# Patient Record
Sex: FEMALE | Race: OTHER
[De-identification: names, ages, dates, MRNs, and addresses within clinical notes are randomized per-mention and may not be internally consistent; named-entity substitution may affect disease eponyms.]

---

## 2017-10-30 ENCOUNTER — HOSPITAL ENCOUNTER (EMERGENCY)
Dept: HOSPITAL 80 - FED | Age: 22
LOS: 1 days | Discharge: HOME | End: 2017-10-31
Payer: COMMERCIAL

## 2017-10-30 DIAGNOSIS — M54.5: Primary | ICD-10-CM

## 2017-10-30 DIAGNOSIS — G89.29: ICD-10-CM

## 2017-10-30 NOTE — EDPHY
H & P


Stated Complaint: LOW BACK PAIN X 4 YEARS


Time Seen by Provider: 10/30/17 21:28


HPI/ROS: 





CHIEF COMPLAINT:  Low back pain x4 years, worse tonight





HISTORY OF PRESENT ILLNESS:  22-year-old female history of low back pain for 

the past 4 years after giving birth to her child, works as a dental hygienist, 

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 

pain.  She describes it as primarily in her bilateral buttocks with no 

radicular symptoms into her lower extremities.  Pain is reproducible with range 

of motion.  No trauma.  No fall.  No weakness.  No incontinence.  No retention.

  No saddle anesthesia.  No urinary abnormality.





She has been seen at the [facility] for similar, never had MRI of back 





PRIMARY CARE PROVIDER:the [facility]  





REVIEW OF SYSTEMS:


A ten point review of systems was performed and is negative with the exception 

of the items mentioned in the HPI








PAST MEDICAL & SURGICAL  HISTORY:  low back pain x4 years    





SOCIAL HISTORY: nonsmoker.  No IV drug use.    














************


PHYSICAL EXAM





(Prior to examination, patient consented to physical exam, hands were washed 

and my usual and customary physical exam procedures followed)


1) GENERAL: Well-developed, well-nourished, alert and oriented.  Appears 

uncomfortable 


2) HEAD: Normocephalic, atraumatic


3) HEENT: Pupils equal, round, reactive to light bilaterally.  Sclera 

anicteric.  Nasopharynx, oropharynx, clear, no lesions.  


4) NECK: Full range of motion, no meningeal signs.


5) LUNGS: Clear auscultation bilaterally, no wheezes, no rhonchi, no 

retractions.   


6) HEART: Regular rate and rhythm, no murmur, no heave, no gallop.


7) ABDOMEN: No guarding, no rebound, no focal tenderness, negative McBurney's, 

negative Jain's, negative Rovsing's, negative peritoneal sign,


8) MUSCULOSKELETAL: Moving all extremities, no focal areas of tenderness, no 

obvious trauma.  No peripheral edema or discoloration.


9) BACK:  Positive straight leg lift test right leg approximately 10 degrees.. 

No CVA tenderness, no midline vertebral tenderness, no fluctuance, no step-off, 

no obvious trauma, no visual or palpable abnormality.  Patella, Achilles 

reflexes intact to bilateral strength 5/5


10) SKIN: No rash, no petechiae. 








***************





DIFFERENTIAL DIAGNOSIS:  In no particular order, including but not limited to,  

fracture, sprain/strain, cauda equina, spinal infectious etiology. 





**************


MEDICAL DECISION MAKING


This patient has been re-evaluated with serial examinations.  She did feel 

transient dizziness and anxiety after being given ketamine.  She was observed 

for period of time given IV fluids and subsequently felt improvement.  I 

empathized with her chronic pain.  Informed her that I have a Lower index of 

suspicion for cauda equina, epidural abscess, epidural hematoma, lumbar myositis

, diskitis, as the patient is neurologically intact in the lower extremities, 

has patella and Achilles reflexes intact and equal bilaterally, has no 

neurologic deficits, no incontinence, no retention, no midline pain, no 

fluctuance, afebrile, no flulike symptoms.  Pain may be secondary to muscular 

strain, may be secondary to discogenic etiology.  At this point I do not 

identify definitive indication for emergent MRI, however patient may 

necessitate this on an outpatient basis. 





- Personal History


LMP (Females 10-55): 8-14 Days Ago


Current Tetanus/Diphtheria Vaccine: Yes


Current Tetanus Diphtheria and Acellular Pertussis (TDAP): Yes





- Medical/Surgical History


Hx Asthma: No


Hx Chronic Respiratory Disease: No


Hx Diabetes: No


Hx Cardiac Disease: No


Hx Renal Disease: No


Hx Cirrhosis: No


Hx Alcoholism: No


Hx HIV/AIDS: No


Hx Splenectomy or Spleen Trauma: No


Other PMH: OWER BACK PAIN X 4 YEARS





- Social History


Smoking Status: Never smoked


Constitutional: 


 Initial Vital Signs











Temperature (C)  36.7 C   10/30/17 21:00


 


Heart Rate  94   10/30/17 21:00


 


Respiratory Rate  18   10/30/17 21:00


 


Blood Pressure  121/83 H  10/30/17 21:00


 


O2 Sat (%)  96   10/30/17 21:00








 











O2 Delivery Mode               Room Air














Allergies/Adverse Reactions: 


 





No Known Allergies Allergy (Unverified 10/30/17 21:02)


 








Home Medications: 














 Medication  Instructions  Recorded


 


Hydrocodone/APAP 5/325 [Norco 1 tab PO Q6 PRN #10 tab 10/30/17





5/325 (RX)]  


 


methylPREDNISolone [Medrol Dose 4 mg PO DAILY #1 ea 10/30/17





Varghese]  














Medical Decision Making





- Data Points


Laboratory Results: 


 











  10/30/17





  21:54


 


Beta HCG, Qual  NEGATIVE 





  











Medications Given: 


 








Discontinued Medications





Dexamethasone (Decadron Injection)  8 mg IVP EDNOW ONE


   Stop: 10/30/17 21:54


   Last Admin: 10/30/17 22:08 Dose:  8 mg


Ketamine HCl (Ketamine)  12.7 mg 0.2 mg/kg (12.7 mg) IVP EDNOW ONE


   Stop: 10/30/17 21:54


   Last Admin: 10/30/17 22:08 Dose:  12.7 mg








Departure





- Departure


Disposition: Home, Routine, Self-Care


Clinical Impression: 


Low back pain


Qualifiers:


 Chronicity: chronic Back pain laterality: bilateral Sciatica presence: without 

sciatica Qualified Code(s): M54.5 - Low back pain; G89.29 - Other chronic pain; 

G89.29 - Other chronic pain





Condition: Good


Instructions:  Acute Low Back Pain (ED)


Additional Instructions: 


Seek medical attention if you develop new or worsening pain, if you develop 

bladder or bowel dysfunction, numbness around your perineum, foot drop, or any 

other symptoms that concern you. 


Referrals: 


Roma Mandujano PA [Primary Care Provider] - 1-2 days without fail


Prescriptions: 


Hydrocodone/APAP 5/325 [Norco 5/325 (RX)] 1 tab PO Q6 PRN #10 tab


 PRN Reason: Pain, Severe


methylPREDNISolone [Medrol Dose Varghese] 4 mg PO DAILY #1 ea

## 2017-10-30 NOTE — EDPHY
H & P


Stated Complaint: LOW BACK PAIN X 4 YEARS


Time Seen by Provider: 10/30/17 21:28


HPI/ROS: 





CHIEF COMPLAINT:  Low back pain x4 years, worse tonight





HISTORY OF PRESENT ILLNESS:  22-year-old female history of low back pain for 

the past 4 years after giving birth to her child, works as a dental hygienist, 

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 

pain.  She describes it as primarily in her bilateral buttocks with no 

radicular symptoms into her lower extremities.  Pain is reproducible with range 

of motion.  No trauma.  No fall.  No weakness.  No incontinence.  No retention.

  No saddle anesthesia.  No urinary abnormality.





She has been seen at the [facility] for similar, never had MRI of back 





PRIMARY CARE PROVIDER:the [facility]  





REVIEW OF SYSTEMS:


A ten point review of systems was performed and is negative with the exception 

of the items mentioned in the HPI








PAST MEDICAL & SURGICAL  HISTORY:  low back pain x4 years    





SOCIAL HISTORY: nonsmoker.  No IV drug use.    














************


PHYSICAL EXAM





(Prior to examination, patient consented to physical exam, hands were washed 

and my usual and customary physical exam procedures followed)


1) GENERAL: Well-developed, well-nourished, alert and oriented.  Appears 

uncomfortable 


2) HEAD: Normocephalic, atraumatic


3) HEENT: Pupils equal, round, reactive to light bilaterally.  Sclera 

anicteric.  Nasopharynx, oropharynx, clear, no lesions.  


4) NECK: Full range of motion, no meningeal signs.


5) LUNGS: Clear auscultation bilaterally, no wheezes, no rhonchi, no 

retractions.   


6) HEART: Regular rate and rhythm, no murmur, no heave, no gallop.


7) ABDOMEN: No guarding, no rebound, no focal tenderness, negative McBurney's, 

negative Jain's, negative Rovsing's, negative peritoneal sign,


8) MUSCULOSKELETAL: Moving all extremities, no focal areas of tenderness, no 

obvious trauma.  No peripheral edema or discoloration.


9) BACK:  Positive straight leg lift test right leg approximately 10 degrees.. 

No CVA tenderness, no midline vertebral tenderness, no fluctuance, no step-off, 

no obvious trauma, no visual or palpable abnormality.  Patella, Achilles 

reflexes intact to bilateral strength 5/5


10) SKIN: No rash, no petechiae. 








***************





DIFFERENTIAL DIAGNOSIS:  In no particular order, including but not limited to,  

fracture, sprain/strain, cauda equina, spinal infectious etiology. 





**************


MEDICAL DECISION MAKING


This patient has been re-evaluated with serial examinations.  She did feel 

transient dizziness and anxiety after being given ketamine.  She was observed 

for period of time given IV fluids and subsequently felt improvement.  I 

empathized with her chronic pain.  Informed her that I have a Lower index of 

suspicion for cauda equina, epidural abscess, epidural hematoma, lumbar myositis

, diskitis, as the patient is neurologically intact in the lower extremities, 

has patella and Achilles reflexes intact and equal bilaterally, has no 

neurologic deficits, no incontinence, no retention, no midline pain, no 

fluctuance, afebrile, no flulike symptoms.  Pain may be secondary to muscular 

strain, may be secondary to discogenic etiology.  At this point I do not 

identify definitive indication for emergent MRI, however patient may 

necessitate this on an outpatient basis. 





- Personal History


LMP (Females 10-55): 8-14 Days Ago


Current Tetanus/Diphtheria Vaccine: Yes


Current Tetanus Diphtheria and Acellular Pertussis (TDAP): Yes





- Medical/Surgical History


Hx Asthma: No


Hx Chronic Respiratory Disease: No


Hx Diabetes: No


Hx Cardiac Disease: No


Hx Renal Disease: No


Hx Cirrhosis: No


Hx Alcoholism: No


Hx HIV/AIDS: No


Hx Splenectomy or Spleen Trauma: No


Other PMH: OWER BACK PAIN X 4 YEARS





- Social History


Smoking Status: Never smoked


Constitutional: 


 Initial Vital Signs











Temperature (C)  36.7 C   10/30/17 21:00


 


Heart Rate  94   10/30/17 21:00


 


Respiratory Rate  18   10/30/17 21:00


 


Blood Pressure  121/83 H  10/30/17 21:00


 


O2 Sat (%)  96   10/30/17 21:00








 











O2 Delivery Mode               Room Air














Allergies/Adverse Reactions: 


 





No Known Allergies Allergy (Unverified 10/30/17 21:02)


 








Home Medications: 














 Medication  Instructions  Recorded


 


Hydrocodone/APAP 5/325 [Norco 1 tab PO Q6 PRN #10 tab 10/30/17





5/325 (RX)]  


 


methylPREDNISolone [Medrol Dose 4 mg PO DAILY #1 ea 10/30/17





Varghese]  














Medical Decision Making





- Data Points


Laboratory Results: 


 











  10/30/17





  21:54


 


Beta HCG, Qual  NEGATIVE 





  











Medications Given: 


 








Discontinued Medications





Dexamethasone (Decadron Injection)  8 mg IVP EDNOW ONE


   Stop: 10/30/17 21:54


   Last Admin: 10/30/17 22:08 Dose:  8 mg


Ketamine HCl (Ketamine)  12.7 mg 0.2 mg/kg (12.7 mg) IVP EDNOW ONE


   Stop: 10/30/17 21:54


   Last Admin: 10/30/17 22:08 Dose:  12.7 mg








Departure





- Departure


Disposition: Home, Routine, Self-Care


Clinical Impression: 


Low back pain


Qualifiers:


 Chronicity: chronic Back pain laterality: bilateral Sciatica presence: without 

sciatica Qualified Code(s): M54.5 - Low back pain; G89.29 - Other chronic pain; 

G89.29 - Other chronic pain





Condition: Good


Instructions:  Acute Low Back Pain (ED)


Additional Instructions: 


Seek medical attention if you develop new or worsening pain, if you develop 

bladder or bowel dysfunction, numbness around your perineum, foot drop, or any 

other symptoms that concern you. 


Referrals: 


Roma Mandujano PA [Primary Care Provider] - 1-2 days without fail


Prescriptions: 


Hydrocodone/APAP 5/325 [Norco 5/325 (RX)] 1 tab PO Q6 PRN #10 tab


 PRN Reason: Pain, Severe


methylPREDNISolone [Medrol Dose Varghese] 4 mg PO DAILY #1 ea

## 2017-10-30 NOTE — EDPHY
H & P


Stated Complaint: LOW BACK PAIN X 4 YEARS


Time Seen by Provider: 10/30/17 21:28


HPI/ROS: 





CHIEF COMPLAINT:  Low back pain x4 years, worse tonight





HISTORY OF PRESENT ILLNESS:  22-year-old female history of low back pain for 

the past 4 years after giving birth to her child, works as a dental hygienist, 

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 

pain.  She describes it as primarily in her bilateral buttocks with no 

radicular symptoms into her lower extremities.  Pain is reproducible with range 

of motion.  No trauma.  No fall.  No weakness.  No incontinence.  No retention.

  No saddle anesthesia.  No urinary abnormality.





She has been seen at the [facility] for similar, never had MRI of back 





PRIMARY CARE PROVIDER:the [facility]  





REVIEW OF SYSTEMS:


A ten point review of systems was performed and is negative with the exception 

of the items mentioned in the HPI








PAST MEDICAL & SURGICAL  HISTORY:  low back pain x4 years    





SOCIAL HISTORY: nonsmoker.  No IV drug use.    














************


PHYSICAL EXAM





(Prior to examination, patient consented to physical exam, hands were washed 

and my usual and customary physical exam procedures followed)


1) GENERAL: Well-developed, well-nourished, alert and oriented.  Appears 

uncomfortable 


2) HEAD: Normocephalic, atraumatic


3) HEENT: Pupils equal, round, reactive to light bilaterally.  Sclera 

anicteric.  Nasopharynx, oropharynx, clear, no lesions.  


4) NECK: Full range of motion, no meningeal signs.


5) LUNGS: Clear auscultation bilaterally, no wheezes, no rhonchi, no 

retractions.   


6) HEART: Regular rate and rhythm, no murmur, no heave, no gallop.


7) ABDOMEN: No guarding, no rebound, no focal tenderness, negative McBurney's, 

negative Jain's, negative Rovsing's, negative peritoneal sign,


8) MUSCULOSKELETAL: Moving all extremities, no focal areas of tenderness, no 

obvious trauma.  No peripheral edema or discoloration.


9) BACK:  Positive straight leg lift test right leg approximately 10 degrees.. 

No CVA tenderness, no midline vertebral tenderness, no fluctuance, no step-off, 

no obvious trauma, no visual or palpable abnormality.  Patella, Achilles 

reflexes intact to bilateral strength 5/5


10) SKIN: No rash, no petechiae. 








***************





DIFFERENTIAL DIAGNOSIS:  In no particular order, including but not limited to,  

fracture, sprain/strain, cauda equina, spinal infectious etiology. 





**************


MEDICAL DECISION MAKING


This patient has been re-evaluated with serial examinations.  She did feel 

transient dizziness and anxiety after being given ketamine.  She was observed 

for period of time given IV fluids and subsequently felt improvement.  I 

empathized with her chronic pain.  Informed her that I have a Lower index of 

suspicion for cauda equina, epidural abscess, epidural hematoma, lumbar myositis

, diskitis, as the patient is neurologically intact in the lower extremities, 

has patella and Achilles reflexes intact and equal bilaterally, has no 

neurologic deficits, no incontinence, no retention, no midline pain, no 

fluctuance, afebrile, no flulike symptoms.  Pain may be secondary to muscular 

strain, may be secondary to discogenic etiology.  At this point I do not 

identify definitive indication for emergent MRI, however patient may 

necessitate this on an outpatient basis. 





- Personal History


LMP (Females 10-55): 8-14 Days Ago


Current Tetanus/Diphtheria Vaccine: Yes


Current Tetanus Diphtheria and Acellular Pertussis (TDAP): Yes





- Medical/Surgical History


Hx Asthma: No


Hx Chronic Respiratory Disease: No


Hx Diabetes: No


Hx Cardiac Disease: No


Hx Renal Disease: No


Hx Cirrhosis: No


Hx Alcoholism: No


Hx HIV/AIDS: No


Hx Splenectomy or Spleen Trauma: No


Other PMH: OWER BACK PAIN X 4 YEARS





- Social History


Smoking Status: Never smoked


Constitutional: 


 Initial Vital Signs











Temperature (C)  36.7 C   10/30/17 21:00


 


Heart Rate  94   10/30/17 21:00


 


Respiratory Rate  18   10/30/17 21:00


 


Blood Pressure  121/83 H  10/30/17 21:00


 


O2 Sat (%)  96   10/30/17 21:00








 











O2 Delivery Mode               Room Air














Allergies/Adverse Reactions: 


 





No Known Allergies Allergy (Unverified 10/30/17 21:02)


 








Home Medications: 














 Medication  Instructions  Recorded


 


Hydrocodone/APAP 5/325 [Norco 1 tab PO Q6 PRN #10 tab 10/30/17





5/325 (RX)]  


 


methylPREDNISolone [Medrol Dose 4 mg PO DAILY #1 ea 10/30/17





Varghese]  














Medical Decision Making





- Data Points


Laboratory Results: 


 











  10/30/17





  21:54


 


Beta HCG, Qual  NEGATIVE 





  











Medications Given: 


 








Discontinued Medications





Dexamethasone (Decadron Injection)  8 mg IVP EDNOW ONE


   Stop: 10/30/17 21:54


   Last Admin: 10/30/17 22:08 Dose:  8 mg


Ketamine HCl (Ketamine)  12.7 mg 0.2 mg/kg (12.7 mg) IVP EDNOW ONE


   Stop: 10/30/17 21:54


   Last Admin: 10/30/17 22:08 Dose:  12.7 mg








Departure





- Departure


Disposition: Home, Routine, Self-Care


Clinical Impression: 


Low back pain


Qualifiers:


 Chronicity: chronic Back pain laterality: bilateral Sciatica presence: without 

sciatica Qualified Code(s): M54.5 - Low back pain; G89.29 - Other chronic pain; 

G89.29 - Other chronic pain





Condition: Good


Instructions:  Acute Low Back Pain (ED)


Additional Instructions: 


Seek medical attention if you develop new or worsening pain, if you develop 

bladder or bowel dysfunction, numbness around your perineum, foot drop, or any 

other symptoms that concern you. 


Referrals: 


Roma Mandujano PA [Primary Care Provider] - 1-2 days without fail


Prescriptions: 


Hydrocodone/APAP 5/325 [Norco 5/325 (RX)] 1 tab PO Q6 PRN #10 tab


 PRN Reason: Pain, Severe


methylPREDNISolone [Medrol Dose Varghese] 4 mg PO DAILY #1 ea

## 2017-10-31 VITALS
RESPIRATION RATE: 16 BRPM | DIASTOLIC BLOOD PRESSURE: 68 MMHG | HEART RATE: 64 BPM | SYSTOLIC BLOOD PRESSURE: 103 MMHG | OXYGEN SATURATION: 98 % | TEMPERATURE: 98.8 F

## 2018-04-22 ENCOUNTER — HOSPITAL ENCOUNTER (EMERGENCY)
Dept: HOSPITAL 80 - FED | Age: 23
Discharge: HOME | End: 2018-04-22
Payer: COMMERCIAL

## 2018-04-22 VITALS — DIASTOLIC BLOOD PRESSURE: 69 MMHG | SYSTOLIC BLOOD PRESSURE: 115 MMHG

## 2018-04-22 DIAGNOSIS — S00.01XA: ICD-10-CM
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
## 2018-04-22 DIAGNOSIS — O9A.212: Primary | ICD-10-CM

## 2018-04-22 DIAGNOSIS — Z3A.18: ICD-10-CM

## 2018-04-22 NOTE — EDPHY
H & P


Stated Complaint: Lac head from bumping into cabinet door


Time Seen by Provider: 18 19:00


HPI/ROS: 





Chief Complaint:  Minor head injury





HPI:  The patient presents the ED with a minor head injury.  She struck her 

head on the edge of a cabinet.  She did have some bleeding.  There is no loss 

of consciousness.  She was concerned about the possibility of a laceration 

prompting her visit to the ED today.  She denies additional injury.  She is 

approximately 18 weeks pregnant.  Tetanus shot is up-to-date.





REVIEW OF SYSTEMS:


Neuro:  no headache, numbness, weakness


Musculoskeletal:  as above


Skin:  As above


Source: Patient


Exam Limitations: No limitations





- Personal History


LMP (Females 10-55): Pregnant


EDC: 19


Current Tetanus Diphtheria and Acellular Pertussis (TDAP): Yes





- Medical/Surgical History


Hx Asthma: No


Hx Chronic Respiratory Disease: No


Hx Diabetes: No


Hx Cardiac Disease: No


Hx Renal Disease: No


Hx Cirrhosis: No


Hx Alcoholism: No


Hx HIV/AIDS: No


Hx Splenectomy or Spleen Trauma: No


Other PMH: LOWER BACK PAIN,CHRONIC.  





- Social History


Smoking Status: Never smoked





- Physical Exam


Exam: 








General Appearance:  Alert, no distress


Head:  Superficial nonsuturable abrasion noted to the left temporal scalp.  No 

hematoma, no bony tenderness


Neck:  Nontender, trachea midline











Constitutional: 


 Initial Vital Signs











Temperature (C)  37.2 C   18 18:52


 


Heart Rate  80   18 18:52


 


Respiratory Rate  16   18 18:52


 


Blood Pressure  115/69   18 18:52


 


O2 Sat (%)  100   18 18:52








 











O2 Delivery Mode               Room Air














Allergies/Adverse Reactions: 


 





No Known Allergies Allergy (Verified 18 18:52)


 








Home Medications: 














 Medication  Instructions  Recorded


 


NK [No Known Home Meds]  18














Medical Decision Making


ED Course/Re-evaluation: 





The patient has a nonsuturable abrasion.  She has no additional injury.  She 

has no pregnancy-related complaints.  She will be discharged from the emergency 

department with customary aftercare instructions and return precautions.





Departure





- Departure


Disposition: Home, Routine, Self-Care


Clinical Impression: 


Scalp abrasion


Qualifiers:


 Encounter type: initial encounter Qualified Code(s): S00.01XA - Abrasion of 

scalp, initial encounter





Condition: Good


Instructions:  Abrasion (ED)


Additional Instructions: 


1.  Tylenol as needed for pain.


2.  Return to the ED for uncontrolled bleeding, severe headache or other 

concerns.


3.  Follow up with your primary care provider as scheduled.








Referrals: 


Roma Mandujano PA [Primary Care Provider] - As per Instructions

## 2018-05-04 ENCOUNTER — HOSPITAL ENCOUNTER (OUTPATIENT)
Dept: HOSPITAL 80 - FLD | Age: 23
Setting detail: OBSERVATION
Discharge: HOME | End: 2018-05-04
Attending: OBSTETRICS & GYNECOLOGY | Admitting: OBSTETRICS & GYNECOLOGY
Payer: COMMERCIAL

## 2018-05-04 DIAGNOSIS — Z3A.00: ICD-10-CM

## 2018-05-04 DIAGNOSIS — O46.90: Primary | ICD-10-CM
